# Patient Record
Sex: MALE | Race: BLACK OR AFRICAN AMERICAN | Employment: UNEMPLOYED | URBAN - METROPOLITAN AREA
[De-identification: names, ages, dates, MRNs, and addresses within clinical notes are randomized per-mention and may not be internally consistent; named-entity substitution may affect disease eponyms.]

---

## 2023-02-20 ENCOUNTER — HOSPITAL ENCOUNTER (EMERGENCY)
Age: 53
Discharge: HOME | End: 2023-02-20
Attending: EMERGENCY MEDICINE
Payer: COMMERCIAL

## 2023-02-20 ENCOUNTER — APPOINTMENT (OUTPATIENT)
Dept: CT IMAGING | Age: 53
End: 2023-02-20
Payer: COMMERCIAL

## 2023-02-20 VITALS
SYSTOLIC BLOOD PRESSURE: 152 MMHG | TEMPERATURE: 98.2 F | OXYGEN SATURATION: 96 % | RESPIRATION RATE: 16 BRPM | WEIGHT: 225 LBS | BODY MASS INDEX: 30.48 KG/M2 | HEART RATE: 103 BPM | DIASTOLIC BLOOD PRESSURE: 87 MMHG | HEIGHT: 72 IN

## 2023-02-20 DIAGNOSIS — K52.9 GASTROENTERITIS: Primary | ICD-10-CM

## 2023-02-20 LAB
ALBUMIN SERPL-MCNC: 4.2 G/DL (ref 3.5–5)
ALBUMIN/GLOB SERPL: 1.1 (ref 1.1–2.2)
ALP SERPL-CCNC: 33 U/L (ref 45–117)
ALT SERPL-CCNC: 41 U/L (ref 12–78)
ANION GAP SERPL CALC-SCNC: 8 MMOL/L (ref 5–15)
AST SERPL-CCNC: 18 U/L (ref 15–37)
BASOPHILS # BLD: 0 K/UL (ref 0–0.1)
BASOPHILS NFR BLD: 0 % (ref 0–1)
BILIRUB SERPL-MCNC: 0.7 MG/DL (ref 0.2–1)
BUN SERPL-MCNC: 14 MG/DL (ref 6–20)
BUN/CREAT SERPL: 10 (ref 12–20)
CALCIUM SERPL-MCNC: 9.2 MG/DL (ref 8.5–10.1)
CHLORIDE SERPL-SCNC: 106 MMOL/L (ref 97–108)
CO2 SERPL-SCNC: 24 MMOL/L (ref 21–32)
COMMENT, HOLDF: NORMAL
CREAT SERPL-MCNC: 1.35 MG/DL (ref 0.7–1.3)
DIFFERENTIAL METHOD BLD: ABNORMAL
EOSINOPHIL # BLD: 0 K/UL (ref 0–0.4)
EOSINOPHIL NFR BLD: 0 % (ref 0–7)
ERYTHROCYTE [DISTWIDTH] IN BLOOD BY AUTOMATED COUNT: 13.3 % (ref 11.5–14.5)
GLOBULIN SER CALC-MCNC: 4 G/DL (ref 2–4)
GLUCOSE SERPL-MCNC: 134 MG/DL (ref 65–100)
HCT VFR BLD AUTO: 48.6 % (ref 36.6–50.3)
HGB BLD-MCNC: 16.5 G/DL (ref 12.1–17)
IMM GRANULOCYTES # BLD AUTO: 0.1 K/UL (ref 0–0.04)
IMM GRANULOCYTES NFR BLD AUTO: 1 % (ref 0–0.5)
LIPASE SERPL-CCNC: 131 U/L (ref 73–393)
LYMPHOCYTES # BLD: 0.2 K/UL (ref 0.8–3.5)
LYMPHOCYTES NFR BLD: 2 % (ref 12–49)
MCH RBC QN AUTO: 27.8 PG (ref 26–34)
MCHC RBC AUTO-ENTMCNC: 34 G/DL (ref 30–36.5)
MCV RBC AUTO: 82 FL (ref 80–99)
MONOCYTES # BLD: 0.9 K/UL (ref 0–1)
MONOCYTES NFR BLD: 7 % (ref 5–13)
NEUTS SEG # BLD: 11.1 K/UL (ref 1.8–8)
NEUTS SEG NFR BLD: 90 % (ref 32–75)
NRBC # BLD: 0 K/UL (ref 0–0.01)
NRBC BLD-RTO: 0 PER 100 WBC
PLATELET # BLD AUTO: 240 K/UL (ref 150–400)
PMV BLD AUTO: 10.4 FL (ref 8.9–12.9)
POTASSIUM SERPL-SCNC: 3.8 MMOL/L (ref 3.5–5.1)
PROT SERPL-MCNC: 8.2 G/DL (ref 6.4–8.2)
RBC # BLD AUTO: 5.93 M/UL (ref 4.1–5.7)
RBC MORPH BLD: ABNORMAL
SAMPLES BEING HELD,HOLD: NORMAL
SODIUM SERPL-SCNC: 138 MMOL/L (ref 136–145)
WBC # BLD AUTO: 12.3 K/UL (ref 4.1–11.1)

## 2023-02-20 PROCEDURE — 74177 CT ABD & PELVIS W/CONTRAST: CPT

## 2023-02-20 PROCEDURE — 85025 COMPLETE CBC W/AUTO DIFF WBC: CPT

## 2023-02-20 PROCEDURE — 80053 COMPREHEN METABOLIC PANEL: CPT

## 2023-02-20 PROCEDURE — 74011000636 HC RX REV CODE- 636: Performed by: EMERGENCY MEDICINE

## 2023-02-20 PROCEDURE — 96374 THER/PROPH/DIAG INJ IV PUSH: CPT

## 2023-02-20 PROCEDURE — 99285 EMERGENCY DEPT VISIT HI MDM: CPT

## 2023-02-20 PROCEDURE — 36415 COLL VENOUS BLD VENIPUNCTURE: CPT

## 2023-02-20 PROCEDURE — 74011250636 HC RX REV CODE- 250/636

## 2023-02-20 PROCEDURE — 83690 ASSAY OF LIPASE: CPT

## 2023-02-20 RX ORDER — ONDANSETRON 2 MG/ML
4 INJECTION INTRAMUSCULAR; INTRAVENOUS
Status: COMPLETED | OUTPATIENT
Start: 2023-02-20 | End: 2023-02-20

## 2023-02-20 RX ORDER — ONDANSETRON 4 MG/1
4 TABLET, FILM COATED ORAL
Qty: 20 TABLET | Refills: 0 | Status: SHIPPED | OUTPATIENT
Start: 2023-02-20

## 2023-02-20 RX ADMIN — IOPAMIDOL 100 ML: 755 INJECTION, SOLUTION INTRAVENOUS at 21:10

## 2023-02-20 RX ADMIN — ONDANSETRON 4 MG: 2 INJECTION INTRAMUSCULAR; INTRAVENOUS at 19:06

## 2023-02-20 NOTE — ED TRIAGE NOTES
Pt arrives to the ER for complaints of midsternal/epigatric pain that radiates to the abdominal area with an unspecific location along with vomiting and diarrhea. Pt reports that this started this morning. Reports taking Pepcid at 1100 this morning along with pepto bismol at 1100 and 1500.

## 2023-02-21 NOTE — ED PROVIDER NOTES
Abdominal Pain   Associated symptoms include diarrhea, nausea and vomiting. Pertinent negatives include no fever, no constipation, no headaches, no arthralgias, no myalgias and no chest pain. Vomiting   Associated symptoms include abdominal pain and diarrhea. Pertinent negatives include no chills, no fever, no headaches, no arthralgias, no myalgias, no cough and no headaches. Diarrhea   Associated symptoms include diarrhea, nausea and vomiting. Pertinent negatives include no fever, no constipation, no headaches, no arthralgias, no myalgias and no chest pain. Juan Calros Felix is a 47 y.o. male with no relevant past medical history who presents ambulatory to [facility] ED with cc of abdominal pain since this morning. Patient reports midsternal and epigastric abdominal pain radiating to the periumbilical region since this morning. Patient also reports nausea, vomiting, diarrhea, decreased appetite, subjective fever. Patient has tried Pepcid and Pepto-Bismol without relief. Patient denies fever, chills, blood in stool or vomitus, constipation, recent sick contacts. PCP: None    There are no other complaints, changes or physical findings at this time. No past medical history on file. No past surgical history on file. No family history on file.     Social History     Socioeconomic History    Marital status:      Spouse name: Not on file    Number of children: Not on file    Years of education: Not on file    Highest education level: Not on file   Occupational History    Not on file   Tobacco Use    Smoking status: Not on file    Smokeless tobacco: Not on file   Substance and Sexual Activity    Alcohol use: Not on file    Drug use: Not on file    Sexual activity: Not on file   Other Topics Concern    Not on file   Social History Narrative    Not on file     Social Determinants of Health     Financial Resource Strain: Not on file   Food Insecurity: Not on file   Transportation Needs: Not on file   Physical Activity: Not on file   Stress: Not on file   Social Connections: Not on file   Intimate Partner Violence: Not on file   Housing Stability: Not on file         ALLERGIES: Patient has no known allergies. Review of Systems   Constitutional:  Negative for activity change, appetite change, chills and fever. HENT:  Negative for congestion, rhinorrhea and sore throat. Respiratory:  Negative for cough and shortness of breath. Cardiovascular:  Negative for chest pain. Gastrointestinal:  Positive for abdominal pain, diarrhea, nausea and vomiting. Negative for abdominal distention, blood in stool and constipation. Genitourinary:  Negative for decreased urine volume and difficulty urinating. Musculoskeletal:  Negative for arthralgias and myalgias. Skin:  Negative for color change and rash. Neurological:  Negative for light-headedness and headaches. Psychiatric/Behavioral:  Negative for agitation and confusion. The patient is not nervous/anxious. Vitals:    02/20/23 1802   BP: (!) 152/87   Pulse: (!) 103   Resp: 16   Temp: 98.2 °F (36.8 °C)   SpO2: 96%   Weight: 102.1 kg (225 lb)   Height: 6' (1.829 m)            Physical Exam  Vitals and nursing note reviewed. Constitutional:       Appearance: Normal appearance. HENT:      Head: Normocephalic and atraumatic. Right Ear: External ear normal.      Left Ear: External ear normal.      Nose: Nose normal.      Mouth/Throat:      Mouth: Mucous membranes are moist.   Eyes:      Extraocular Movements: Extraocular movements intact. Conjunctiva/sclera: Conjunctivae normal.      Pupils: Pupils are equal, round, and reactive to light. Cardiovascular:      Rate and Rhythm: Normal rate and regular rhythm. Pulses: Normal pulses. Heart sounds: Normal heart sounds. Pulmonary:      Effort: Pulmonary effort is normal.      Breath sounds: Normal breath sounds. Abdominal:      Palpations: Abdomen is soft.       Tenderness: There is generalized abdominal tenderness. There is no guarding or rebound. Musculoskeletal:         General: Normal range of motion. Cervical back: Normal range of motion and neck supple. Skin:     General: Skin is warm and dry. Capillary Refill: Capillary refill takes less than 2 seconds. Neurological:      General: No focal deficit present. Mental Status: He is alert and oriented to person, place, and time. Mental status is at baseline. Psychiatric:         Mood and Affect: Mood normal.         Behavior: Behavior normal.         Thought Content: Thought content normal.         Judgment: Judgment normal.        Medical Decision Making  Ddx: Viral gastroenteritis, early appendicitis, pancreatitis, and others    25-year-old male presents with epigastric abdominal pain since this morning. Generalized tenderness on exam.  Blood work unremarkable except for WBC of 12.3. Lipase normal, pancreatitis ruled out. CT shows evidence of gastroenteritis. Gave Zofran with relief of nausea. Discharged with Zofran prescription, PCP follow-up, strict return precautions. Amount and/or Complexity of Data Reviewed  Labs: ordered. Decision-making details documented in ED Course. Radiology: ordered. Decision-making details documented in ED Course. Risk  Prescription drug management. Procedures    LABORATORY TESTS:  Recent Results (from the past 12 hour(s))   SAMPLES BEING HELD    Collection Time: 02/20/23  6:06 PM   Result Value Ref Range    SAMPLES BEING HELD 1DK GRN,1PST,1RED,1BLUE,1SST,1LAV     COMMENT        Add-on orders for these samples will be processed based on acceptable specimen integrity and analyte stability, which may vary by analyte.    CBC WITH AUTOMATED DIFF    Collection Time: 02/20/23  6:06 PM   Result Value Ref Range    WBC 12.3 (H) 4.1 - 11.1 K/uL    RBC 5.93 (H) 4.10 - 5.70 M/uL    HGB 16.5 12.1 - 17.0 g/dL    HCT 48.6 36.6 - 50.3 %    MCV 82.0 80.0 - 99.0 FL    MCH 27.8 26.0 - 34.0 PG    MCHC 34.0 30.0 - 36.5 g/dL    RDW 13.3 11.5 - 14.5 %    PLATELET 391 330 - 784 K/uL    MPV 10.4 8.9 - 12.9 FL    NRBC 0.0 0  WBC    ABSOLUTE NRBC 0.00 0.00 - 0.01 K/uL    NEUTROPHILS 90 (H) 32 - 75 %    LYMPHOCYTES 2 (L) 12 - 49 %    MONOCYTES 7 5 - 13 %    EOSINOPHILS 0 0 - 7 %    BASOPHILS 0 0 - 1 %    IMMATURE GRANULOCYTES 1 (H) 0.0 - 0.5 %    ABS. NEUTROPHILS 11.1 (H) 1.8 - 8.0 K/UL    ABS. LYMPHOCYTES 0.2 (L) 0.8 - 3.5 K/UL    ABS. MONOCYTES 0.9 0.0 - 1.0 K/UL    ABS. EOSINOPHILS 0.0 0.0 - 0.4 K/UL    ABS. BASOPHILS 0.0 0.0 - 0.1 K/UL    ABS. IMM. GRANS. 0.1 (H) 0.00 - 0.04 K/UL    DF SMEAR SCANNED      RBC COMMENTS NORMOCYTIC, NORMOCHROMIC     METABOLIC PANEL, COMPREHENSIVE    Collection Time: 02/20/23  6:06 PM   Result Value Ref Range    Sodium 138 136 - 145 mmol/L    Potassium 3.8 3.5 - 5.1 mmol/L    Chloride 106 97 - 108 mmol/L    CO2 24 21 - 32 mmol/L    Anion gap 8 5 - 15 mmol/L    Glucose 134 (H) 65 - 100 mg/dL    BUN 14 6 - 20 MG/DL    Creatinine 1.35 (H) 0.70 - 1.30 MG/DL    BUN/Creatinine ratio 10 (L) 12 - 20      eGFR >60 >60 ml/min/1.73m2    Calcium 9.2 8.5 - 10.1 MG/DL    Bilirubin, total 0.7 0.2 - 1.0 MG/DL    ALT (SGPT) 41 12 - 78 U/L    AST (SGOT) 18 15 - 37 U/L    Alk. phosphatase 33 (L) 45 - 117 U/L    Protein, total 8.2 6.4 - 8.2 g/dL    Albumin 4.2 3.5 - 5.0 g/dL    Globulin 4.0 2.0 - 4.0 g/dL    A-G Ratio 1.1 1.1 - 2.2     LIPASE    Collection Time: 02/20/23  6:06 PM   Result Value Ref Range    Lipase 131 73 - 393 U/L       IMAGING RESULTS:  CT ABD PELV W CONT   Final Result      1. Mild proximal small bowel dilatation, with a generalized transition to normal   caliber distal small bowel. Favor gastroenteritis. 2. No evidence of bowel perforation. 3. Diffuse fatty infiltration of the liver with multiple small liver cysts. 4. Incidentally noted are bilateral adrenal nodules.  These are indeterminate on   the current study and can be further characterized with adrenal mass protocol CT   if indicated. MEDICATIONS GIVEN:  Medications   ondansetron (ZOFRAN) injection 4 mg (4 mg IntraVENous Given 2/20/23 1906)   iopamidoL (ISOVUE-370) 370 mg iodine /mL (76 %) injection 100 mL (100 mL IntraVENous Given 2/20/23 2110)       IMPRESSION:  1. Gastroenteritis        PLAN:  1. Current Discharge Medication List        START taking these medications    Details   ondansetron hcl (Zofran) 4 mg tablet Take 1 Tablet by mouth every eight (8) hours as needed for Nausea or Vomiting. Qty: 20 Tablet, Refills: 0  Start date: 2/20/2023           2. Follow-up Information       Follow up With Specialties Details Why Contact Info    [facility] EMERGENCY DEPT Emergency Medicine Go to  As needed, If symptoms worsen [address]  Schedule an appointment as soon as possible for a visit  As needed, To establish primary care doctor [phone]          3. Return to ED if worse     Presentation, management, and disposition were discussed with the attending physician, Dr. Susanne Guerra, who is in agreement with plan of care.

## 2023-02-21 NOTE — DISCHARGE INSTRUCTIONS
As discussed, your blood work and scan were unremarkable and you most likely have a viral gastroenteritis. You are being prescribed Zofran for nausea. Follow up with your PCP for further management. A referral is being made if you do not have a PCP. Return to the ER if you experience severe worsening symptoms.